# Patient Record
Sex: MALE | Race: WHITE | NOT HISPANIC OR LATINO | Employment: OTHER | URBAN - METROPOLITAN AREA
[De-identification: names, ages, dates, MRNs, and addresses within clinical notes are randomized per-mention and may not be internally consistent; named-entity substitution may affect disease eponyms.]

---

## 2017-06-04 ENCOUNTER — ALLSCRIPTS OFFICE VISIT (OUTPATIENT)
Dept: OTHER | Facility: OTHER | Age: 52
End: 2017-06-04

## 2018-01-13 VITALS
OXYGEN SATURATION: 96 % | TEMPERATURE: 98 F | DIASTOLIC BLOOD PRESSURE: 80 MMHG | SYSTOLIC BLOOD PRESSURE: 122 MMHG | RESPIRATION RATE: 20 BRPM | HEART RATE: 102 BPM

## 2021-02-08 ENCOUNTER — APPOINTMENT (OUTPATIENT)
Dept: RADIOLOGY | Facility: CLINIC | Age: 56
End: 2021-02-08
Attending: FAMILY MEDICINE
Payer: COMMERCIAL

## 2021-02-08 ENCOUNTER — OFFICE VISIT (OUTPATIENT)
Dept: URGENT CARE | Facility: CLINIC | Age: 56
End: 2021-02-08
Payer: COMMERCIAL

## 2021-02-08 VITALS
TEMPERATURE: 98.5 F | BODY MASS INDEX: 39.86 KG/M2 | HEART RATE: 79 BPM | SYSTOLIC BLOOD PRESSURE: 108 MMHG | OXYGEN SATURATION: 98 % | WEIGHT: 278.4 LBS | RESPIRATION RATE: 18 BRPM | DIASTOLIC BLOOD PRESSURE: 66 MMHG | HEIGHT: 70 IN

## 2021-02-08 DIAGNOSIS — S93.401A SPRAIN OF RIGHT ANKLE, UNSPECIFIED LIGAMENT, INITIAL ENCOUNTER: Primary | ICD-10-CM

## 2021-02-08 DIAGNOSIS — S99.911A RIGHT ANKLE INJURY, INITIAL ENCOUNTER: ICD-10-CM

## 2021-02-08 PROCEDURE — 99213 OFFICE O/P EST LOW 20 MIN: CPT | Performed by: FAMILY MEDICINE

## 2021-02-08 PROCEDURE — 73610 X-RAY EXAM OF ANKLE: CPT

## 2021-02-08 NOTE — PATIENT INSTRUCTIONS
1  Right ankle sprain   - xray images of the right ankle show no abnormalities  - ace wrap and air cast applied for comfort and support, use as directed  - rest the leg and keep it elevated   - apply ice to the site  - take Tylenol or Motrin as needed for pain   - referral provided to Dr Deanna Alicia in Orthopedics for further evaluation and treatment

## 2021-02-08 NOTE — PROGRESS NOTES
[facility]        NAME: Carlie Cantu is a 48 y o  male  : 1965    MRN: [id number]  DATE: 2021  TIME: 2:57 PM    Assessment and Plan   Sprain of right ankle, unspecified ligament, initial encounter [S93 [id number]]  1  Sprain of right ankle, unspecified ligament, initial encounter  XR ankle 3+ vw right    Ambulatory referral to Orthopedic Surgery    Ankle Air Cast         Patient Instructions     Patient Instructions   1  Right ankle sprain   - xray images of the right ankle show no abnormalities  - ace wrap and air cast applied for comfort and support, use as directed  - rest the leg and keep it elevated   - apply ice to the site  - take Tylenol or Motrin as needed for pain   - referral provided to Dr Carlo Vaughn in Orthopedics for further evaluation and treatment     Follow up with PCP in 3-5 days  Proceed to  ER if symptoms worsen  Chief Complaint     Chief Complaint   Patient presents with    Ankle Injury     right ankle injury, 1 week ago         History of Present Illness       49 yo male presents for a right ankle injury that occurred 1 week ago  He states he was plowing snow and slipped and twisted his right ankle  He denies any other injuries or complaints  No hitting of head or LOC  He is complaining pain and swelling of the ankle joint  He does not some bruising  He is able to move his ankle but has pain w/ movement and feels pain with walking  He denies any numbness/tingling or weakness of the leg or foot  He states he applied ice to the site when the injury first occurred and took Aleve for the pain  Review of Systems   Review of Systems   Constitutional: Negative  Respiratory: Negative  Cardiovascular: Negative  Musculoskeletal:        As noted in HPI   Skin:        As noted in HPI   Allergic/Immunologic: Negative  Neurological: Negative  Hematological: Negative  Current Medications     No current outpatient medications on file      Current Allergies     Allergies as of 02/08/2021    (No Known Allergies)            The following portions of the patient's history were reviewed and updated as appropriate: allergies, current medications, past family history, past medical history, past social history, past surgical history and problem list      Past Medical History:   Diagnosis Date    Patient denies medical problems        Past Surgical History:   Procedure Laterality Date    HYDROCELE EXCISION / [address]      right side       Family History   Problem Relation Age of Onset    COPD Father     Parkinsonism Father          Medications have been verified  Objective   /66 (BP Location: Right arm, Patient Position: Sitting, Cuff Size: Standard)   Pulse 79   Temp 98 5 °F (36 9 °C) (Tympanic)   Resp 18   Ht 5' 10" (1 778 m)   Wt 126 kg (278 lb 6 4 oz)   SpO2 98%   BMI 39 95 kg/m²   No LMP for male patient  Physical Exam     Physical Exam  Vitals signs and nursing note reviewed  Constitutional:       General: He is awake  He is not in acute distress  Appearance: Normal appearance  He is well-developed and well-groomed  He is obese  He is not ill-appearing, toxic-appearing or diaphoretic  Cardiovascular:      Rate and Rhythm: Normal rate  Pulses: Normal pulses  Pulmonary:      Effort: Pulmonary effort is normal  No tachypnea, accessory muscle usage or respiratory distress  Musculoskeletal:      Comments: Right foot/ankle: there is generalized swelling and tenderness to palpation of the lateral ankle joint  There is very mild bruising present  The area is tender to touch  No tenderness over the lateral malleolus  Ankle w/ full ROM, feels discomfort w/ movement  Skin is appropriately warm and intact  No wounds  Sensations intact  Good capillary refill  Palpable pulses  Patient is noted to be limping on ambulation  Skin:     General: Skin is warm and dry        Capillary Refill: Capillary refill takes less than 2 seconds  Coloration: Skin is not pale  Findings: Bruising present  No erythema, rash or wound  Neurological:      Mental Status: He is alert and oriented to person, place, and time  Mental status is at baseline  Psychiatric:         Attention and Perception: Attention and perception normal          Mood and Affect: Mood and affect normal          Speech: Speech normal          Behavior: Behavior normal  Behavior is cooperative  Thought Content:  Thought content normal          Cognition and Memory: Cognition and memory normal          Judgment: Judgment normal